# Patient Record
Sex: MALE | Race: BLACK OR AFRICAN AMERICAN | HISPANIC OR LATINO | Employment: OTHER | URBAN - METROPOLITAN AREA
[De-identification: names, ages, dates, MRNs, and addresses within clinical notes are randomized per-mention and may not be internally consistent; named-entity substitution may affect disease eponyms.]

---

## 2019-09-30 ENCOUNTER — OFFICE VISIT CONVERTED (OUTPATIENT)
Dept: ORTHOPEDIC SURGERY | Facility: CLINIC | Age: 49
End: 2019-09-30
Attending: ORTHOPAEDIC SURGERY

## 2019-09-30 ENCOUNTER — CONVERSION ENCOUNTER (OUTPATIENT)
Dept: ORTHOPEDIC SURGERY | Facility: CLINIC | Age: 49
End: 2019-09-30

## 2019-12-06 ENCOUNTER — OFFICE VISIT CONVERTED (OUTPATIENT)
Dept: UROLOGY | Facility: CLINIC | Age: 49
End: 2019-12-06
Attending: UROLOGY

## 2019-12-06 ENCOUNTER — CONVERSION ENCOUNTER (OUTPATIENT)
Dept: SURGERY | Facility: CLINIC | Age: 49
End: 2019-12-06

## 2019-12-09 ENCOUNTER — OFFICE VISIT CONVERTED (OUTPATIENT)
Dept: ORTHOPEDIC SURGERY | Facility: CLINIC | Age: 49
End: 2019-12-09
Attending: ORTHOPAEDIC SURGERY

## 2020-01-31 ENCOUNTER — PROCEDURE VISIT CONVERTED (OUTPATIENT)
Dept: UROLOGY | Facility: CLINIC | Age: 50
End: 2020-01-31
Attending: UROLOGY

## 2020-04-13 ENCOUNTER — OFFICE VISIT CONVERTED (OUTPATIENT)
Dept: UROLOGY | Facility: CLINIC | Age: 50
End: 2020-04-13
Attending: UROLOGY

## 2020-08-04 ENCOUNTER — OFFICE VISIT CONVERTED (OUTPATIENT)
Dept: SURGERY | Facility: CLINIC | Age: 50
End: 2020-08-04
Attending: SURGERY

## 2021-05-10 NOTE — H&P
"   History and Physical      Patient Name: Han Chicas   Patient ID: [id number]   Sex: Male   YOB: 1970    Primary Care Provider: [facility]   Referring Provider: Deidre Fernandez NP    Visit Date: August 4, 2020    Provider: Romulo Price MD   Location[facility]   Location Address: [address] [place] [address]  [id number]   Location Phone: [phone]          Chief Complaint  · Outpatient History & Physical / Surgical Orders      History Of Present Illness     Mr. Chicas came in today for evaluation. He is a nice gentleman that had a few days of upper abdominal discomfort a few weeks ago and he did have some nausea and vomiting for a day or two. He has changed his diet around and over the last week or so, he has gotten to where he feels pretty good. Currently he is not really having any unusual symptoms.  He had an ultrasound that showed some fatty liver replacement but was otherwise okay. He basically had normal chemistries and a normal white count. His AST and ALT were minimally elevated but otherwise his total bilirubin was normal.       Past Medical History  Chronic pain; Hypertension; Nightmares; PTSD; Reflux         Past Surgical History  Achilles tendon repair; Ankle surgery; Dental Surgery         Medication List  bupropion HCl oral; buspirone oral; chlorthalidone 25 mg oral tablet; ibuprofen 800 mg oral tablet; lamotrigine oral; pantoprazole oral; prazosin 2 mg oral capsule; trazodone 50 mg oral tablet         Allergy List  NO KNOWN DRUG ALLERGIES         Family Medical History  Diabetes, unspecified type         Social History  Alcohol Use (Current some day); lives with children; lives with spouse; .; Recreational Drug Use (Never); Tobacco (Current some day); Working         Vitals  Date Time BP Position Site L\R Cuff Size HR RR TEMP (F) WT  HT  BMI kg/m2 BSA m2 O2 Sat HC       08/04/2020 03:35 PM       14  181lbs 0oz 5'  8\" 27.52 1.98           Physical " Examination     Today on physical exam, he appears well today. His abdomen is soft and nontender.           Assessment  · Pre-Surgical Orders     V72.84       Very nice gentleman who may have had a viral GI bug or perhaps even some food poisoning. He seems to be quite a bit better now. His workup otherwise really didn't turn up anything too worrisome.       Plan  · Orders  o Surgery Order (GENOR) - - 08/05/2020  · Medications  o Medications have been Reconciled  o Transition of Care or Provider Policy  · Instructions  o ****Surgical Orders****  o RISK AND BENEFITS:  o Consent for surgery: Given these options, the patient has verbally expressed an understanding of the risks of surgery and finds these risks acceptable. We will proceed with surgery as soon as possible.  o Consult Anesthesia for any post-operative block, or any pain management procedure deemed necessary by the anestesiologist for adequate post-operative pain control.   o O.R. PREP: Per protocol  o PLEASE SIGN PERMIT FOR:  o *___The above History and Physical Examination has been completed within 30 days of admission.  · Referrals  o ID: [id number] Date: 08/03/2020 Type: Inbound  Specialty: General Surgery     At this point, we will just follow him expectantly and he was fine with that plan. I have asked him to call me back should he have any worsening of his condition and he indicated that he would.             Electronically Signed by: Trish Bowen-, -Author on August 5, 2020 11:01:44 AM  Electronically Co-signed by: Romulo Price MD -Reviewer on August 5, 2020 03:05:59 PM

## 2021-05-12 NOTE — PROGRESS NOTES
Quick Note      Patient Name: Han Chicas   Patient ID: [id number]   Sex: Male   YOB: 1970    Primary Care Provider: [facility]   Referring Provider: Deidre Fernandez NP    Visit Date: April 13, 2020    Provider: Butch Ervin MD   Location: [facility]   Location Address: [address] [place] [address]  [id number]   Location Phone: [phone]          History Of Present Illness  Han Chicas is a 49 year old /Black male who is here status post vasectomy. 0 sperm noted on a #20 power field. Pt notified via telephone on 4/13/2020 at 0950 by LARS Weber.           Assessment  · Postoperative Visit-status post vasectomy     V67.00    Problems Reconciled  Plan  · Orders  o IOP - Semen Analysis (95237, 10625) - V67.00 - 04/13/2020  · Medications  o Medications have been Reconciled  o Transition of Care or Provider Policy  · Instructions  o Instructed patient to follow-up prn.            Electronically Signed by: Butch Ervin MD -Author on April 13, 2020 10:06:26 AM

## 2021-05-15 VITALS — RESPIRATION RATE: 14 BRPM | BODY MASS INDEX: 27.43 KG/M2 | WEIGHT: 181 LBS | HEIGHT: 68 IN

## 2021-05-15 VITALS — RESPIRATION RATE: 14 BRPM | WEIGHT: 185 LBS | HEIGHT: 67 IN | BODY MASS INDEX: 29.03 KG/M2

## 2021-05-15 VITALS — BODY MASS INDEX: 29.82 KG/M2 | HEIGHT: 67 IN | WEIGHT: 190 LBS | RESPIRATION RATE: 12 BRPM

## 2021-05-15 VITALS — OXYGEN SATURATION: 98 % | BODY MASS INDEX: 28.09 KG/M2 | WEIGHT: 179 LBS | HEART RATE: 96 BPM | HEIGHT: 67 IN

## 2021-05-15 VITALS — WEIGHT: 190 LBS | OXYGEN SATURATION: 97 % | BODY MASS INDEX: 29.82 KG/M2 | HEART RATE: 71 BPM | HEIGHT: 67 IN

## 2021-12-17 ENCOUNTER — APPOINTMENT (OUTPATIENT)
Dept: GENERAL RADIOLOGY | Facility: HOSPITAL | Age: 51
End: 2021-12-17

## 2021-12-17 ENCOUNTER — HOSPITAL ENCOUNTER (EMERGENCY)
Facility: HOSPITAL | Age: 51
Discharge: HOME | End: 2021-12-18
Attending: EMERGENCY MEDICINE | Admitting: EMERGENCY MEDICINE

## 2021-12-17 VITALS
RESPIRATION RATE: 16 BRPM | TEMPERATURE: 98.2 F | BODY MASS INDEX: 26.25 KG/M2 | SYSTOLIC BLOOD PRESSURE: 119 MMHG | HEART RATE: 78 BPM | WEIGHT: 177.25 LBS | HEIGHT: 69 IN | DIASTOLIC BLOOD PRESSURE: 74 MMHG | OXYGEN SATURATION: 99 %

## 2021-12-17 DIAGNOSIS — S16.1XXA NECK STRAIN, INITIAL ENCOUNTER: Primary | ICD-10-CM

## 2021-12-17 PROCEDURE — 25010000002 KETOROLAC TROMETHAMINE PER 15 MG: Performed by: NURSE PRACTITIONER

## 2021-12-17 PROCEDURE — 96372 THER/PROPH/DIAG INJ SC/IM: CPT

## 2021-12-17 PROCEDURE — 99283 EMERGENCY DEPT VISIT LOW MDM: CPT

## 2021-12-17 PROCEDURE — 72050 X-RAY EXAM NECK SPINE 4/5VWS: CPT

## 2021-12-17 RX ORDER — CHLORTHALIDONE 25 MG/1
TABLET ORAL
COMMUNITY

## 2021-12-17 RX ORDER — TRAZODONE HYDROCHLORIDE 50 MG/1
TABLET ORAL
COMMUNITY

## 2021-12-17 RX ORDER — CYCLOBENZAPRINE HCL 10 MG
10 TABLET ORAL ONCE
Status: COMPLETED | OUTPATIENT
Start: 2021-12-17 | End: 2021-12-17

## 2021-12-17 RX ORDER — KETOROLAC TROMETHAMINE 10 MG/1
10 TABLET, FILM COATED ORAL EVERY 6 HOURS PRN
Qty: 20 TABLET | Refills: 0 | OUTPATIENT
Start: 2021-12-17 | End: 2022-01-06

## 2021-12-17 RX ORDER — CYCLOBENZAPRINE HCL 10 MG
10 TABLET ORAL 3 TIMES DAILY PRN
Qty: 30 TABLET | Refills: 0 | Status: SHIPPED | OUTPATIENT
Start: 2021-12-17

## 2021-12-17 RX ORDER — PANTOPRAZOLE SODIUM 40 MG/1
TABLET, DELAYED RELEASE ORAL
COMMUNITY

## 2021-12-17 RX ORDER — PRAZOSIN HYDROCHLORIDE 2 MG/1
CAPSULE ORAL
COMMUNITY

## 2021-12-17 RX ORDER — BUPROPION HYDROCHLORIDE 100 MG/1
100 TABLET ORAL 2 TIMES DAILY
COMMUNITY

## 2021-12-17 RX ORDER — IBUPROFEN 800 MG/1
TABLET ORAL
COMMUNITY
End: 2021-12-17

## 2021-12-17 RX ORDER — LAMOTRIGINE 100 MG/1
100 TABLET ORAL DAILY
COMMUNITY

## 2021-12-17 RX ORDER — KETOROLAC TROMETHAMINE 30 MG/ML
60 INJECTION, SOLUTION INTRAMUSCULAR; INTRAVENOUS ONCE
Status: COMPLETED | OUTPATIENT
Start: 2021-12-17 | End: 2021-12-17

## 2021-12-17 RX ADMIN — KETOROLAC TROMETHAMINE 60 MG: 60 INJECTION, SOLUTION INTRAMUSCULAR at 22:40

## 2021-12-17 RX ADMIN — CYCLOBENZAPRINE 10 MG: 10 TABLET, FILM COATED ORAL at 22:40

## 2021-12-18 NOTE — ED PROVIDER NOTES
Problem: Falls - Risk of  Goal: *Absence of Falls  Description  Document Milta Raring Fall Risk and appropriate interventions in the flowsheet.   Outcome: Progressing Towards Goal  Note:   Fall Risk Interventions:  Mobility Interventions: Bed/chair exit alarm         Medication Interventions: Patient to call before getting OOB    Elimination Interventions: Call light in reach    History of Falls Interventions: Bed/chair exit alarm, Consult care management for discharge planning, Door open when patient unattended Subjective   Patient reports neck pain on the left side of his neck for the last week which continues to worsen with each passing day.  He has had no injuries and states that he suspected it might be as he slept wrong but it keeps hurting so his wife told him to come get it checked out.      History provided by:  Patient   used: No        Review of Systems   Constitutional: Negative for chills and fever.   HENT: Negative for congestion, ear pain, rhinorrhea and sore throat.    Eyes: Negative for pain.   Respiratory: Negative for cough and shortness of breath.    Cardiovascular: Negative for chest pain.   Gastrointestinal: Negative for abdominal pain, diarrhea, nausea and vomiting.   Genitourinary: Negative for decreased urine volume, dysuria and flank pain.   Musculoskeletal: Positive for neck pain. Negative for arthralgias and myalgias.   Skin: Negative for rash.   Neurological: Negative for seizures and headaches.   All other systems reviewed and are negative.      Past Medical History:   Diagnosis Date   • Depression    • GERD (gastroesophageal reflux disease)    • PTSD (post-traumatic stress disorder)        No Known Allergies    Past Surgical History:   Procedure Laterality Date   • ACHILLES TENDON SURGERY         History reviewed. No pertinent family history.    Social History     Socioeconomic History   • Marital status:    Tobacco Use   • Smoking status: Current Every Day Smoker     Types: Cigars   • Smokeless tobacco: Never Used   Substance and Sexual Activity   • Alcohol use: Yes     Comment: Social   • Drug use: Never           Objective   Physical Exam  Vitals and nursing note reviewed.   Constitutional:       General: He is not in acute distress.     Appearance: Normal appearance. He is normal weight. He is not ill-appearing, toxic-appearing or diaphoretic.   HENT:      Head: Normocephalic and atraumatic.      Right Ear: External ear normal.      Left Ear: External ear normal.    Eyes:      General: No scleral icterus.     Conjunctiva/sclera: Conjunctivae normal.      Pupils: Pupils are equal, round, and reactive to light.   Cardiovascular:      Rate and Rhythm: Normal rate and regular rhythm.      Heart sounds: Normal heart sounds.   Pulmonary:      Effort: Pulmonary effort is normal. No respiratory distress.      Breath sounds: Normal breath sounds.   Abdominal:      General: Abdomen is flat. Bowel sounds are normal. There is no distension.      Palpations: Abdomen is soft.      Tenderness: There is no abdominal tenderness.   Musculoskeletal:         General: No swelling, deformity or signs of injury. Normal range of motion.      Cervical back: Normal range of motion and neck supple. Tenderness present.   Lymphadenopathy:      Cervical: No cervical adenopathy.   Skin:     General: Skin is warm and dry.      Capillary Refill: Capillary refill takes less than 2 seconds.   Neurological:      General: No focal deficit present.      Mental Status: He is alert and oriented to person, place, and time.   Psychiatric:         Mood and Affect: Mood normal.         Behavior: Behavior normal.         Procedures           ED Course                                                 MDM  Number of Diagnoses or Management Options  Neck strain, initial encounter: new and requires workup     Amount and/or Complexity of Data Reviewed  Tests in the radiology section of CPT®: ordered and reviewed    Patient Progress  Patient progress: stable      Final diagnoses:   Neck strain, initial encounter       ED Disposition  ED Disposition     ED Disposition Condition Comment    Discharge Stable           Pepe Meredith MD  [address] [facility] [address]  [phone]      As needed         Medication List      New Prescriptions    cyclobenzaprine 10 MG tablet  Commonly known as: FLEXERIL  Take 1 tablet by mouth 3 (Three) Times a Day As Needed for Muscle Spasms.     ketorolac 10 MG tablet  Commonly known as:  TORADOL  Take 1 tablet by mouth Every 6 (Six) Hours As Needed for Moderate Pain .           Where to Get Your Medications      These medications were sent to [facility]/pharmacy #[id number] - [place], [place] - [address] - [phone]  - [phone] FX  [address] [place] [address]    Hours: 24-hours Phone: [phone]   · cyclobenzaprine 10 MG tablet  · ketorolac 10 MG tablet          Jes Alston, MARIA LUISA  12/18/21 0020       Jes Alston APRN  12/18/21 0021

## 2022-01-05 ENCOUNTER — APPOINTMENT (OUTPATIENT)
Dept: GENERAL RADIOLOGY | Facility: HOSPITAL | Age: 52
End: 2022-01-05

## 2022-01-05 VITALS
HEIGHT: 69 IN | TEMPERATURE: 98.2 F | SYSTOLIC BLOOD PRESSURE: 136 MMHG | WEIGHT: 178.35 LBS | HEART RATE: 83 BPM | BODY MASS INDEX: 26.42 KG/M2 | RESPIRATION RATE: 20 BRPM | OXYGEN SATURATION: 99 % | DIASTOLIC BLOOD PRESSURE: 79 MMHG

## 2022-01-05 PROCEDURE — 99282 EMERGENCY DEPT VISIT SF MDM: CPT

## 2022-01-05 PROCEDURE — 73630 X-RAY EXAM OF FOOT: CPT

## 2022-01-06 ENCOUNTER — HOSPITAL ENCOUNTER (EMERGENCY)
Facility: HOSPITAL | Age: 52
Discharge: HOME | End: 2022-01-06
Attending: EMERGENCY MEDICINE | Admitting: EMERGENCY MEDICINE

## 2022-01-06 DIAGNOSIS — M72.2 PLANTAR FASCIITIS OF RIGHT FOOT: Primary | ICD-10-CM

## 2022-01-06 RX ORDER — DICLOFENAC SODIUM 75 MG/1
75 TABLET, DELAYED RELEASE ORAL 2 TIMES DAILY PRN
Qty: 20 TABLET | Refills: 0 | Status: SHIPPED | OUTPATIENT
Start: 2022-01-06

## 2022-01-06 RX ORDER — IBUPROFEN 400 MG/1
800 TABLET ORAL ONCE
Status: DISCONTINUED | OUTPATIENT
Start: 2022-01-06 | End: 2022-01-06 | Stop reason: HOSPADM

## 2022-01-06 RX ORDER — DICLOFENAC SODIUM 75 MG/1
75 TABLET, DELAYED RELEASE ORAL 2 TIMES DAILY PRN
Qty: 20 TABLET | Refills: 0 | Status: SHIPPED | OUTPATIENT
Start: 2022-01-06 | End: 2022-01-06 | Stop reason: SDUPTHER

## 2022-01-06 NOTE — DISCHARGE INSTRUCTIONS
Activity as tolerated.  Apply cold compresses to affected areas.  Apply for 20 to 30 minutes 3-5 times per day as needed.  Take the pain medication as needed.  Take with food to avoid upset stomach.  Follow your family doctor in 7 to 10 days.  If symptoms or not improving may discuss getting referral to podiatrist.  Return to the ER for increasing pain, development of redness and swelling or any other concerns issues that may arise.

## 2022-01-06 NOTE — ED PROVIDER NOTES
Time: 1:53 AM EST  Arrived by: Private vehicle  Chief Complaint: Right foot pain  History provided by: Patient  History is limited by: N/A     History of Present Illness:  Patient is a 51 y.o.  male that presents to the emergency department with right foot pain.  Patient reports the pain began rather abruptly approximate 24 hours ago.  He denies any trauma or injury.  Patient denies any prior history of pain like this.  Patient denies any redness or swelling.    HPI    Patient Care Team  Primary Care Provider: Pepe Meredith MD    Past Medical History:     No Known Allergies  Past Medical History:   Diagnosis Date   • Depression    • GERD (gastroesophageal reflux disease)    • PTSD (post-traumatic stress disorder)      Past Surgical History:   Procedure Laterality Date   • ACHILLES TENDON SURGERY       History reviewed. No pertinent family history.    Home Medications:  Prior to Admission medications    Medication Sig Start Date End Date Taking? Authorizing Provider   buPROPion (WELLBUTRIN) 100 MG tablet Take 100 mg by mouth 2 (Two) Times a Day.    ProviderAleena MD   chlorthalidone (HYGROTON) 25 MG tablet chlorthalidone 25 mg oral tablet take 1 tablet (25 mg) by oral route once daily   Active    ProviderAleena MD   cyclobenzaprine (FLEXERIL) 10 MG tablet Take 1 tablet by mouth 3 (Three) Times a Day As Needed for Muscle Spasms. 12/17/21   Jes Alston APRN   ketorolac (TORADOL) 10 MG tablet Take 1 tablet by mouth Every 6 (Six) Hours As Needed for Moderate Pain . 12/17/21   Jes Alston APRN   lamoTRIgine (LaMICtal) 100 MG tablet Take 100 mg by mouth Daily.    ProviderAleena MD   pantoprazole (Protonix) 40 MG EC tablet Protonix 40 mg oral tablet,delayed release (DR/EC) take 1 tablet (40 mg) by oral route once daily   Suspended    Aleena Olmedo MD   prazosin (MINIPRESS) 2 MG capsule prazosin 2 mg oral capsule take 1 capsule (2 mg) by oral route 2 times per day   Active    Provider  "MD Aleena   traZODone (DESYREL) 50 MG tablet trazodone 50 mg oral tablet take 1 tablet (50 mg) by oral route once daily at bedtime   Active    Provider, MD Aleena        Social History:   Social History     Tobacco Use   • Smoking status: Current Every Day Smoker     Types: Cigars   • Smokeless tobacco: Never Used   Substance Use Topics   • Alcohol use: Yes     Comment: Social   • Drug use: Never       Review of Systems:  Review of Systems   Constitutional: Negative for chills and fever.   HENT: Negative for congestion, ear pain and sore throat.    Eyes: Negative for pain.   Respiratory: Negative for cough, chest tightness and shortness of breath.    Cardiovascular: Negative for chest pain.   Gastrointestinal: Negative for abdominal pain, diarrhea, nausea and vomiting.   Genitourinary: Negative for flank pain and hematuria.   Musculoskeletal: Positive for gait problem (Right foot pain). Negative for joint swelling.   Skin: Negative for pallor.   Neurological: Negative for seizures and headaches.   All other systems reviewed and are negative.         Physical Exam:  /79   Pulse 83   Temp 98.2 °F (36.8 °C)   Resp 20   Ht 175.3 cm (69\")   Wt 80.9 kg (178 lb 5.6 oz)   SpO2 99%   BMI 26.34 kg/m²     Physical Exam Vital signs were reviewed under triage note.  General appearance - Patient appears well-developed and well-nourished.  Patient is in no acute distress.  Head - Normocephalic, atraumatic.  Pupils - Equal, round, reactive to light.  Extraocular muscles are intact.  Conjunctive is clear.  Nasal - Normal inspection.  No evidence of trauma or epistaxis.  Tympanic membranes - Gray, intact without erythema or retractions.  Oral mucosa - Pink and moist without lesions or erythema.  Uvula is midline.  Chest wall - Atraumatic.  Chest wall is nontender.  There is no vesicular rashes noted.  Neck - Supple.  Trachea was midline.  There is no palpable lymphadenopathy or thyromegaly.  There are no " meningeal signs  Lungs - Clear to auscultation and percussion bilaterally.  Heart - Regular rate and rhythm without any murmurs, clicks, or gallops.  Abdomen - Soft.  Bowel sounds are present.  There is no palpable tenderness.  There is no rebound, guarding, or rigidity.  There are no palpable masses.  There are no pulsatile masses.  Back - Spine is straight and midline.  There is no CVA tenderness.  Extremities - Intact x4 with full range of motion.  There is no palpable edema.  Pulses are intact x4 and equal.  Patient has tenderness in the plantar region of his right foot.  Most of the tenderness is along the plantar fascia.  There is no erythema or swelling.  There is no skin lesions.  Neurologic - Patient is awake, alert, and oriented x3.  Cranial nerves II through XII are grossly intact.  Motor and sensory functions grossly intact.  Cerebellar function was normal.  Integument - There are no rashes.  There are no petechia or purpura lesions noted.  There are no vesicular lesions noted.            Medications in the Emergency Department:  Medications - No data to display     Labs  Lab Results (last 24 hours)     ** No results found for the last 24 hours. **           Imaging:  XR Foot 3+ View Right    Result Date: 1/5/2022  PROCEDURE: XR FOOT 3+ VW RIGHT  COMPARISON: None.  INDICATIONS: POSTERIOR RIGHT FOOT PAIN; NKI.  FINDINGS: 3 views were obtained. No acute fracture or acute malalignment is identified. If symptoms or clinical concerns persist, consider imaging follow-up.       No acute fracture or acute malalignment is identified.      OMER CANTU JR, MD       Electronically Signed and Approved By: OMER CANTU JR, MD on 1/05/2022 at 23:53                EKG:      Procedures:  Procedures    Progress                       The patient was seen and evaluated the ED by me.  The above history and physical examination was performed as document.  X-rays were negative.  Patient will be started on some NSAIDs for  plantar fasciitis.  Patient was advised that if his symptoms or not better in 7 to 10 days he may want to consider following up with a podiatrist.      Medical Decision Making:  [facility]     Final diagnoses:   Plantar fasciitis of right foot        Disposition:  ED Disposition     ED Disposition Condition Comment    Discharge Stable            Romulo Ugalde DO  01/06/22 0200

## 2022-01-11 ENCOUNTER — OFFICE VISIT (OUTPATIENT)
Dept: CARDIOLOGY | Facility: CLINIC | Age: 52
End: 2022-01-11

## 2022-01-11 VITALS
DIASTOLIC BLOOD PRESSURE: 82 MMHG | SYSTOLIC BLOOD PRESSURE: 138 MMHG | HEIGHT: 69 IN | BODY MASS INDEX: 26.36 KG/M2 | HEART RATE: 75 BPM | WEIGHT: 178 LBS

## 2022-01-11 DIAGNOSIS — R94.31 ABNORMAL EKG: ICD-10-CM

## 2022-01-11 DIAGNOSIS — R42 DIZZINESS: ICD-10-CM

## 2022-01-11 DIAGNOSIS — R00.2 PALPITATIONS: Primary | ICD-10-CM

## 2022-01-11 PROCEDURE — 99203 OFFICE O/P NEW LOW 30 MIN: CPT | Performed by: SPECIALIST

## 2022-01-11 NOTE — PROGRESS NOTES
[facility]   Cardiology Consult Note    Patient Name: Han Chicas  : 1970  Referring Physician: Phoebe Fonseca MD  Subjective   Subjective     Reason for Consult/ Chief Complaint:   Chief Complaint   Patient presents with   • Palpitations   • Dizziness   • Abnormal ECG       HPI:  Han Chicas is a 51 y.o. male with history of palpitations on and off for the last 6 to 7 months.  Palpitations last for few seconds relieved spontaneously.  No chest pains.  Also has some occasional dizziness which lasted a few seconds.    Review of Systems:   Constitutional no fever,  no weight loss   Skin no rash   Otolaryngeal no difficulty swallowing   Cardiovascular See HPI   Pulmonary no cough, no sputum production   Gastrointestinal no constipation, no diarrhea   Genitourinary no dysuria, no hematuria   Hematologic no easy bruisability, no abnormal bleeding   Musculoskeletal no muscle pain   Neurologic no dizziness, no falls     Personal History     Past Medical History:  Past Medical History:   Diagnosis Date   • Depression    • Dizziness    • GERD (gastroesophageal reflux disease)    • Palpitations    • PTSD (post-traumatic stress disorder)        Family History:   Family History   Problem Relation Age of Onset   • Atrial fibrillation Mother        Social History:  reports that he has been smoking cigars. He has never used smokeless tobacco. He reports current alcohol use. He reports that he does not use drugs.    Home Medications:  buPROPion, chlorthalidone, cyclobenzaprine, diclofenac, lamoTRIgine, pantoprazole, prazosin, and traZODone    Allergies:  No Known Allergies    Objective    Objective     Vitals:   Heart Rate:  [75] 75  BP: (138)/(82) 138/82  Body mass index is 26.29 kg/m².  Physical Exam:   Constitutional: Awake, alert, No acute distress    Eyes: PERRLA, sclerae anicteric, no conjunctival injection   HENT: NCAT, mucous membranes moist   Neck: Supple, no thyromegaly, no lymphadenopathy, trachea  midline   Respiratory: Clear to auscultation bilaterally, nonlabored respirations    Cardiovascular: RRR, no murmurs or rubs. Palpable pedal pulses bilaterally   Gastrointestinal: Positive bowel sounds, soft, nontender, nondistended   Musculoskeletal: No bilateral ankle edema, no clubbing or cyanosis to extremities   Psychiatric: Appropriate affect, cooperative   Neurologic: Oriented x 3, strength symmetric in all extremities, Cranial Nerves grossly intact to confrontation, speech clear   Skin: No rashes     Result Review    Result Review:  I have personally reviewed the available results:  [x]  Laboratory  [x]  EKG/Telemetry   [x]  Cardiology/Vascular   [x] Medications  [x]  Old records     EKG Reviewed shows sinus rhythm with nonspecific ST-T wave changes.    Procedures     Impression/Plan  1.  Palpitations: 24-hour Holter to evaluate for any significant arrhythmias and echocardiogram to evaluate for mitral valve prolapse.  2.  Dizziness: 24-hour Holter and echocardiogram.  Asked him to increase his fluid intake and be on low caffeine diet.  3.  Abnormal EKG: Treadmill stress test.        Electronically signed by Blayne Stinson MD, 01/11/22, 11:57 AM EST.